# Patient Record
Sex: FEMALE | Race: WHITE | Employment: FULL TIME | URBAN - METROPOLITAN AREA
[De-identification: names, ages, dates, MRNs, and addresses within clinical notes are randomized per-mention and may not be internally consistent; named-entity substitution may affect disease eponyms.]

---

## 2018-02-19 PROBLEM — E66.01 OBESITY, MORBID (HCC): Status: ACTIVE | Noted: 2018-02-19

## 2018-08-17 VITALS — BODY MASS INDEX: 40.65 KG/M2 | WEIGHT: 244 LBS | HEIGHT: 65 IN

## 2018-08-20 ENCOUNTER — HOSPITAL ENCOUNTER (OUTPATIENT)
Dept: SURGERY | Age: 28
Discharge: HOME | End: 2018-08-20

## 2022-03-19 PROBLEM — E66.01 OBESITY, MORBID (HCC): Status: ACTIVE | Noted: 2018-02-19

## 2022-08-31 ENCOUNTER — HOSPITAL ENCOUNTER (EMERGENCY)
Dept: ULTRASOUND IMAGING | Age: 32
Discharge: HOME | End: 2022-09-03
Payer: MEDICARE

## 2022-08-31 ENCOUNTER — HOSPITAL ENCOUNTER (EMERGENCY)
Age: 32
Discharge: HOME | End: 2022-08-31
Attending: EMERGENCY MEDICINE | Admitting: EMERGENCY MEDICINE
Payer: MEDICARE

## 2022-08-31 VITALS
RESPIRATION RATE: 16 BRPM | OXYGEN SATURATION: 97 % | SYSTOLIC BLOOD PRESSURE: 132 MMHG | TEMPERATURE: 98.1 F | DIASTOLIC BLOOD PRESSURE: 94 MMHG | WEIGHT: 249 LBS | HEART RATE: 96 BPM | HEIGHT: 66 IN | BODY MASS INDEX: 40.02 KG/M2

## 2022-08-31 DIAGNOSIS — N93.8 DYSFUNCTIONAL UTERINE BLEEDING: Primary | ICD-10-CM

## 2022-08-31 DIAGNOSIS — N30.00 ACUTE CYSTITIS WITHOUT HEMATURIA: ICD-10-CM

## 2022-08-31 LAB
BASOPHILS # BLD: 0.1 K/UL (ref 0–0.2)
BASOPHILS NFR BLD: 1 % (ref 0–2)
DIFFERENTIAL METHOD BLD: NORMAL
EOSINOPHIL # BLD: 0.2 K/UL (ref 0–0.8)
EOSINOPHIL NFR BLD: 2 % (ref 0.5–7.8)
ERYTHROCYTE [DISTWIDTH] IN BLOOD BY AUTOMATED COUNT: 13.1 % (ref 11.9–14.6)
HCG UR QL: NEGATIVE
HCT VFR BLD AUTO: 43.6 % (ref 35.8–46.3)
HGB BLD-MCNC: 14.4 G/DL (ref 11.7–15.4)
IMM GRANULOCYTES # BLD AUTO: 0 K/UL (ref 0–0.5)
IMM GRANULOCYTES NFR BLD AUTO: 0 % (ref 0–5)
LYMPHOCYTES # BLD: 3.4 K/UL (ref 0.5–4.6)
LYMPHOCYTES NFR BLD: 33 % (ref 13–44)
MCH RBC QN AUTO: 28.2 PG (ref 26.1–32.9)
MCHC RBC AUTO-ENTMCNC: 33 G/DL (ref 31.4–35)
MCV RBC AUTO: 85.5 FL (ref 79.6–97.8)
MONOCYTES # BLD: 0.6 K/UL (ref 0.1–1.3)
MONOCYTES NFR BLD: 6 % (ref 4–12)
NEUTS SEG # BLD: 6.1 K/UL (ref 1.7–8.2)
NEUTS SEG NFR BLD: 59 % (ref 43–78)
NRBC # BLD: 0 K/UL (ref 0–0.2)
PLATELET # BLD AUTO: 287 K/UL (ref 150–450)
PMV BLD AUTO: 10.5 FL (ref 9.4–12.3)
RBC # BLD AUTO: 5.1 M/UL (ref 4.05–5.2)
WBC # BLD AUTO: 10.3 K/UL (ref 4.3–11.1)

## 2022-08-31 PROCEDURE — 81003 URINALYSIS AUTO W/O SCOPE: CPT

## 2022-08-31 PROCEDURE — 99284 EMERGENCY DEPT VISIT MOD MDM: CPT

## 2022-08-31 PROCEDURE — 76830 TRANSVAGINAL US NON-OB: CPT

## 2022-08-31 PROCEDURE — 85025 COMPLETE CBC W/AUTO DIFF WBC: CPT

## 2022-08-31 PROCEDURE — 87086 URINE CULTURE/COLONY COUNT: CPT

## 2022-08-31 PROCEDURE — 81025 URINE PREGNANCY TEST: CPT

## 2022-08-31 RX ORDER — NORGESTIMATE AND ETHINYL ESTRADIOL 7DAYSX3 28
1 KIT ORAL DAILY
Qty: 28 TABLET | Refills: 0 | Status: SHIPPED | OUTPATIENT
Start: 2022-08-31

## 2022-08-31 RX ORDER — CEPHALEXIN 500 MG/1
500 CAPSULE ORAL 2 TIMES DAILY
Qty: 14 CAPSULE | Refills: 0 | Status: SHIPPED | OUTPATIENT
Start: 2022-08-31 | End: 2022-09-07

## 2022-08-31 ASSESSMENT — ENCOUNTER SYMPTOMS
SHORTNESS OF BREATH: 0
BACK PAIN: 0
EYES NEGATIVE: 1
DIARRHEA: 0
ABDOMINAL PAIN: 0
NAUSEA: 0

## 2022-08-31 ASSESSMENT — PAIN DESCRIPTION - LOCATION: LOCATION: ABDOMEN

## 2022-08-31 ASSESSMENT — PAIN SCALES - GENERAL: PAINLEVEL_OUTOF10: 1

## 2022-08-31 ASSESSMENT — PAIN - FUNCTIONAL ASSESSMENT: PAIN_FUNCTIONAL_ASSESSMENT: 0-10

## 2022-08-31 NOTE — DISCHARGE INSTRUCTIONS
Take ibuprofen 800 mg every 8 hours for inflammation and pain. Use your Dosepak of birth control if you are vaginal bleeding continues to worsen or you begin to feel symptomatic including dizziness or lightheadedness, shortness of breath. Follow-up with OB/GYN for irregular menstrual bleeding.   Return to the ED for new or worsening symptoms    Take antibiotics for UTI

## 2022-08-31 NOTE — ED NOTES
I have reviewed discharge instructions with the patient. The patient verbalized understanding. Patient left ED via Discharge Method: ambulatory to Home with self. Opportunity for questions and clarification provided. Patient given 2 scripts. To continue your aftercare when you leave the hospital, you may receive an automated call from our care team to check in on how you are doing. This is a free service and part of our promise to provide the best care and service to meet your aftercare needs.  If you have questions, or wish to unsubscribe from this service please call [phone]. Thank you for Choosing our [facility] Emergency Department.         Claude Books, RN  08/31/22 4131

## 2022-08-31 NOTE — ED PROVIDER NOTES
[facility] Emergency Department Provider Note                   PCP:                JUAN ANTONIO Oro NP               Age: 32 y.o. Sex: female       ICD-10-CM    1. Dysfunctional uterine bleeding  N93.8           DISPOSITION         MDM   Craig Ashton is a 32 y.o. female who presents to the Emergency Department with chief complaint of vaginal bleeding. Blood work and urine studies ordered from triage. Her pregnancy test is negative. Ultrasound shows thickened endometrial stripe of 1.5 cm. OB/GYN consulted and recommend hormones if patient is feeling like her bleeding is not improving. She is only day 1 of vaginal bleeding and has a normal hemoglobin of 14.4. Her vital signs additionally are stable. Urine shows large leukocytes, positive nitrates. We will start her on antibiotics, Keflex sent to the pharmacy. Urine culture ordered. OB/GYN follow-up. Ortho Tri-Cyclen Lo prescription given with tapered dosing if patient decides her bleeding is not improving by tomorrow. Vitals are stable. Strict return precautions given. Safe for discharge at this time. SANDIP Gross, ENMIHAELA-C  6:07 PM      Orders Placed This Encounter   Procedures    US PELVIS COMPLETE    CBC with Auto Differential    POCT Urine Dipstick    POC PREGNANCY UR-QUAL    POC Pregnancy Urine Qual    Insert peripheral IV        Medications - No data to display    New Prescriptions    NORGESTIM-ETH ESTRAD TRIPHASIC (ORTHO TRI-CYCLEN, 28,) 0.18/0.215/0.25 MG-35 MCG TABS    Take 1 tablet by mouth daily Take 3 tabs for 3 days followed by 2 tabs for 2 days followed by 1 tab until the Pepe Earing is completed        Craig Ashton is a 32 y.o. female who presents to the Emergency Department with chief complaint of vaginal bleeding.       Chief Complaint   Patient presents with    Vaginal Bleeding      HPI [facility] is a 28-year-old female with history of PCOS and endometriosis, presenting to the ED for evaluation of vaginal bleeding. Patient reports having her IUD pulled on 7/6 followed by 1 week of vaginal bleeding. She had another week of spotting. Yesterday evening, she started having severe lower abdominal cramping followed by heavy vaginal bleeding today. She is going through a super tampon, 1/h. Her abdominal pain is improved with use of ibuprofen at 1230. She denies headache, vision changes, chest pain, shortness of breath, nausea, vomiting, bowel or bladder changes. She is also having some sore breasts, some urinary pressure, some frequency and some vaginal irritation. No concern for STD. All other systems reviewed and are negative unless otherwise stated in the history of present illness section. Review of Systems   Constitutional:  Negative for fever. HENT: Negative. Eyes: Negative. Respiratory:  Negative for shortness of breath. Cardiovascular:  Negative for chest pain. Gastrointestinal:  Negative for abdominal pain, diarrhea and nausea. Genitourinary:  Positive for frequency, urgency and vaginal bleeding. Musculoskeletal:  Negative for arthralgias and back pain. Neurological:  Negative for headaches. All other systems reviewed and are negative. No past medical history on file. No past surgical history on file. No family history on file. Social History     Socioeconomic History    Marital status:         Allergies: Patient has no known allergies. Previous Medications    No medications on file        Vitals signs and nursing note reviewed. Patient Vitals for the past 4 hrs:   Temp Pulse Resp BP SpO2   08/31/22 1459 98.8 °F (37.1 °C) 96 16 (!) 148/100 98 %          Physical Exam  Vitals and nursing note reviewed. Constitutional:       Appearance: She is obese. She is not ill-appearing. HENT:      Mouth/Throat:      Mouth: Mucous membranes are moist.      Pharynx: Oropharynx is clear. Cardiovascular:      Rate and Rhythm: Normal rate.    Pulmonary: Effort: Pulmonary effort is normal.      Breath sounds: Normal breath sounds. Comments: Respirations even and unlabored  Abdominal:      General: Abdomen is flat. Palpations: Abdomen is soft. Comments: Nonsurgical abdomen, no focal tenderness   Genitourinary:     Comments: Deferred due to bleeding  Skin:     General: Skin is warm and dry. Psychiatric:         Mood and Affect: Mood normal.        Procedures      US PELVIS COMPLETE   Final Result   Endometrial thickening at 1.5 cm, but otherwise unremarkable   examination. Voice dictation software was used during the making of this note. This software is not perfect and grammatical and other typographical errors may be present. This note has not been completely proofread for errors.         Chrissie Goodpasture, APRN - NP  08/31/22 [id number]

## 2022-08-31 NOTE — ED TRIAGE NOTES
Patient advises that she had IUD removed July 6 with occasional light spotting since then until yesterday around 1600. Patient advises she started with lower abdominal cramping before that time, heavy bleeding started today and states she is going through super tampon about 1 a hour. Masked.

## 2022-09-03 LAB
BACTERIA SPEC CULT: NORMAL
SERVICE CMNT-IMP: NORMAL